# Patient Record
Sex: FEMALE | Race: WHITE | NOT HISPANIC OR LATINO
[De-identification: names, ages, dates, MRNs, and addresses within clinical notes are randomized per-mention and may not be internally consistent; named-entity substitution may affect disease eponyms.]

---

## 2017-03-15 ENCOUNTER — APPOINTMENT (OUTPATIENT)
Dept: UROLOGY | Facility: CLINIC | Age: 52
End: 2017-03-15

## 2017-03-23 ENCOUNTER — APPOINTMENT (OUTPATIENT)
Dept: UROLOGY | Facility: CLINIC | Age: 52
End: 2017-03-23
Payer: COMMERCIAL

## 2017-03-23 DIAGNOSIS — N28.1 CYST OF KIDNEY, ACQUIRED: ICD-10-CM

## 2017-03-23 LAB
APPEARANCE: CLEAR
BACTERIA: ABNORMAL
BILIRUBIN URINE: NEGATIVE
BLOOD URINE: NEGATIVE
COLOR: YELLOW
GLUCOSE QUALITATIVE U: NORMAL MG/DL
HYALINE CASTS: 3 /LPF
KETONES URINE: NEGATIVE
LEUKOCYTE ESTERASE URINE: NEGATIVE
MICROSCOPIC-UA: NORMAL
NITRITE URINE: NEGATIVE
PH URINE: 6.5
PROTEIN URINE: NEGATIVE MG/DL
RED BLOOD CELLS URINE: 5 /HPF
SPECIFIC GRAVITY URINE: 1.02
SQUAMOUS EPITHELIAL CELLS: 8 /HPF
UROBILINOGEN URINE: NORMAL MG/DL
WHITE BLOOD CELLS URINE: 1 /HPF

## 2017-03-23 PROCEDURE — 99213 OFFICE O/P EST LOW 20 MIN: CPT

## 2017-03-28 LAB — CORE LAB FLUID CYTOLOGY: NORMAL

## 2018-01-29 ENCOUNTER — EMERGENCY (EMERGENCY)
Facility: HOSPITAL | Age: 53
LOS: 1 days | Discharge: HOME | End: 2018-01-29
Attending: EMERGENCY MEDICINE | Admitting: EMERGENCY MEDICINE
Payer: COMMERCIAL

## 2018-01-29 VITALS
TEMPERATURE: 98 F | HEIGHT: 69 IN | WEIGHT: 195.11 LBS | DIASTOLIC BLOOD PRESSURE: 60 MMHG | RESPIRATION RATE: 18 BRPM | SYSTOLIC BLOOD PRESSURE: 119 MMHG | HEART RATE: 70 BPM | OXYGEN SATURATION: 98 %

## 2018-01-29 DIAGNOSIS — Z95.0 PRESENCE OF CARDIAC PACEMAKER: Chronic | ICD-10-CM

## 2018-01-29 LAB
ALBUMIN SERPL ELPH-MCNC: 3.4 G/DL — SIGNIFICANT CHANGE UP (ref 3.3–5)
ALP SERPL-CCNC: 66 U/L — SIGNIFICANT CHANGE UP (ref 40–120)
ALT FLD-CCNC: 25 U/L — SIGNIFICANT CHANGE UP (ref 12–78)
ANION GAP SERPL CALC-SCNC: 6 MMOL/L — SIGNIFICANT CHANGE UP (ref 5–17)
APTT BLD: 32.8 SEC — SIGNIFICANT CHANGE UP (ref 27.5–37.4)
AST SERPL-CCNC: 28 U/L — SIGNIFICANT CHANGE UP (ref 15–37)
BASOPHILS # BLD AUTO: 0.1 K/UL — SIGNIFICANT CHANGE UP (ref 0–0.2)
BASOPHILS NFR BLD AUTO: 1 % — SIGNIFICANT CHANGE UP (ref 0–2)
BILIRUB SERPL-MCNC: 0.2 MG/DL — SIGNIFICANT CHANGE UP (ref 0.2–1.2)
BUN SERPL-MCNC: 21 MG/DL — SIGNIFICANT CHANGE UP (ref 7–23)
CALCIUM SERPL-MCNC: 8.7 MG/DL — SIGNIFICANT CHANGE UP (ref 8.5–10.1)
CHLORIDE SERPL-SCNC: 105 MMOL/L — SIGNIFICANT CHANGE UP (ref 96–108)
CK MB BLD-MCNC: 1.6 % — SIGNIFICANT CHANGE UP (ref 0–3.5)
CK MB CFR SERPL CALC: 1.5 NG/ML — SIGNIFICANT CHANGE UP (ref 0–3.6)
CK SERPL-CCNC: 93 U/L — SIGNIFICANT CHANGE UP (ref 26–192)
CO2 SERPL-SCNC: 28 MMOL/L — SIGNIFICANT CHANGE UP (ref 22–31)
CREAT SERPL-MCNC: 0.69 MG/DL — SIGNIFICANT CHANGE UP (ref 0.5–1.3)
EOSINOPHIL # BLD AUTO: 0.3 K/UL — SIGNIFICANT CHANGE UP (ref 0–0.5)
EOSINOPHIL NFR BLD AUTO: 7.2 % — HIGH (ref 0–6)
GLUCOSE SERPL-MCNC: 90 MG/DL — SIGNIFICANT CHANGE UP (ref 70–99)
HCT VFR BLD CALC: 35.8 % — SIGNIFICANT CHANGE UP (ref 34.5–45)
HGB BLD-MCNC: 11.8 G/DL — SIGNIFICANT CHANGE UP (ref 11.5–15.5)
INR BLD: 1.02 RATIO — SIGNIFICANT CHANGE UP (ref 0.88–1.16)
LIDOCAIN IGE QN: 104 U/L — SIGNIFICANT CHANGE UP (ref 73–393)
LYMPHOCYTES # BLD AUTO: 1.6 K/UL — SIGNIFICANT CHANGE UP (ref 1–3.3)
LYMPHOCYTES # BLD AUTO: 33.5 % — SIGNIFICANT CHANGE UP (ref 13–44)
MCHC RBC-ENTMCNC: 29.8 PG — SIGNIFICANT CHANGE UP (ref 27–34)
MCHC RBC-ENTMCNC: 32.8 GM/DL — SIGNIFICANT CHANGE UP (ref 32–36)
MCV RBC AUTO: 91 FL — SIGNIFICANT CHANGE UP (ref 80–100)
MONOCYTES # BLD AUTO: 0.3 K/UL — SIGNIFICANT CHANGE UP (ref 0–0.9)
MONOCYTES NFR BLD AUTO: 6.5 % — SIGNIFICANT CHANGE UP (ref 1–9)
NEUTROPHILS # BLD AUTO: 2.5 K/UL — SIGNIFICANT CHANGE UP (ref 1.8–7.4)
NEUTROPHILS NFR BLD AUTO: 51.7 % — SIGNIFICANT CHANGE UP (ref 43–77)
PLATELET # BLD AUTO: 163 K/UL — SIGNIFICANT CHANGE UP (ref 150–400)
POTASSIUM SERPL-MCNC: 4.4 MMOL/L — SIGNIFICANT CHANGE UP (ref 3.5–5.3)
POTASSIUM SERPL-SCNC: 4.4 MMOL/L — SIGNIFICANT CHANGE UP (ref 3.5–5.3)
PROT SERPL-MCNC: 7.1 G/DL — SIGNIFICANT CHANGE UP (ref 6–8.3)
PROTHROM AB SERPL-ACNC: 11.1 SEC — SIGNIFICANT CHANGE UP (ref 9.8–12.7)
RBC # BLD: 3.94 M/UL — SIGNIFICANT CHANGE UP (ref 3.8–5.2)
RBC # FLD: 12.4 % — SIGNIFICANT CHANGE UP (ref 10.3–14.5)
SODIUM SERPL-SCNC: 139 MMOL/L — SIGNIFICANT CHANGE UP (ref 135–145)
TROPONIN I SERPL-MCNC: <.015 NG/ML — SIGNIFICANT CHANGE UP (ref 0.01–0.04)
WBC # BLD: 4.8 K/UL — SIGNIFICANT CHANGE UP (ref 3.8–10.5)
WBC # FLD AUTO: 4.8 K/UL — SIGNIFICANT CHANGE UP (ref 3.8–10.5)

## 2018-01-29 PROCEDURE — 36415 COLL VENOUS BLD VENIPUNCTURE: CPT

## 2018-01-29 PROCEDURE — 93005 ELECTROCARDIOGRAM TRACING: CPT

## 2018-01-29 PROCEDURE — 83690 ASSAY OF LIPASE: CPT

## 2018-01-29 PROCEDURE — 82550 ASSAY OF CK (CPK): CPT

## 2018-01-29 PROCEDURE — 80053 COMPREHEN METABOLIC PANEL: CPT

## 2018-01-29 PROCEDURE — 82553 CREATINE MB FRACTION: CPT

## 2018-01-29 PROCEDURE — 99284 EMERGENCY DEPT VISIT MOD MDM: CPT | Mod: 25

## 2018-01-29 PROCEDURE — 99285 EMERGENCY DEPT VISIT HI MDM: CPT

## 2018-01-29 PROCEDURE — 85730 THROMBOPLASTIN TIME PARTIAL: CPT

## 2018-01-29 PROCEDURE — 84484 ASSAY OF TROPONIN QUANT: CPT

## 2018-01-29 PROCEDURE — 85027 COMPLETE CBC AUTOMATED: CPT

## 2018-01-29 PROCEDURE — 71045 X-RAY EXAM CHEST 1 VIEW: CPT | Mod: 26

## 2018-01-29 PROCEDURE — 71045 X-RAY EXAM CHEST 1 VIEW: CPT

## 2018-01-29 PROCEDURE — 85610 PROTHROMBIN TIME: CPT

## 2018-01-29 RX ORDER — ASPIRIN/CALCIUM CARB/MAGNESIUM 324 MG
325 TABLET ORAL ONCE
Qty: 0 | Refills: 0 | Status: COMPLETED | OUTPATIENT
Start: 2018-01-29 | End: 2018-01-29

## 2018-01-29 RX ADMIN — Medication 325 MILLIGRAM(S): at 19:16

## 2018-01-29 NOTE — ED ADULT NURSE NOTE - OBJECTIVE STATEMENT
pt reports migraine this AM, took prescribed meds + excedrine w/o relief , sudden onset CP 1700 to L chest. Denies SHOB, no radiation, A+Ox4, ambulatory w.o assist.

## 2018-01-29 NOTE — ED PROVIDER NOTE - PROGRESS NOTE DETAILS
discussed case with Dr. White, recommend 2 sets of CE, will see patient in office offered patient to interrogate pacemaker device, but patient declined, wants to f/u with Dr. White in office for interrogation discussed case with Dr. White, recommend 2 sets of CE, will see patient in office and will interrogate her device in office offered patient to interrogate pacemaker device here in ED, but patient declined, wants to f/u with Dr. White in office for interrogation

## 2018-01-29 NOTE — ED PROVIDER NOTE - OBJECTIVE STATEMENT
53 yo female hx of pacemaker/sick sinus syndrome c/o chest pain for past 1 hour after taking her migraine medication zomig (she has taken this before), nonradiating, intermittent.  No shortness of breath, no nausea/vomiting.  Cards Dr. White.

## 2018-01-30 VITALS
SYSTOLIC BLOOD PRESSURE: 117 MMHG | DIASTOLIC BLOOD PRESSURE: 79 MMHG | HEART RATE: 66 BPM | OXYGEN SATURATION: 96 % | TEMPERATURE: 98 F | RESPIRATION RATE: 18 BRPM

## 2018-01-30 LAB
CK MB BLD-MCNC: 1.6 % — SIGNIFICANT CHANGE UP (ref 0–3.5)
CK MB CFR SERPL CALC: 1.3 NG/ML — SIGNIFICANT CHANGE UP (ref 0–3.6)
CK SERPL-CCNC: 82 U/L — SIGNIFICANT CHANGE UP (ref 26–192)
TROPONIN I SERPL-MCNC: <.015 NG/ML — SIGNIFICANT CHANGE UP (ref 0.01–0.04)

## 2018-02-15 ENCOUNTER — OUTPATIENT (OUTPATIENT)
Dept: OUTPATIENT SERVICES | Facility: HOSPITAL | Age: 53
LOS: 1 days | End: 2018-02-15
Payer: COMMERCIAL

## 2018-02-15 VITALS
RESPIRATION RATE: 18 BRPM | TEMPERATURE: 99 F | DIASTOLIC BLOOD PRESSURE: 72 MMHG | SYSTOLIC BLOOD PRESSURE: 123 MMHG | WEIGHT: 199.96 LBS | HEART RATE: 81 BPM | HEIGHT: 69 IN

## 2018-02-15 DIAGNOSIS — Z01.818 ENCOUNTER FOR OTHER PREPROCEDURAL EXAMINATION: ICD-10-CM

## 2018-02-15 DIAGNOSIS — M62.81 MUSCLE WEAKNESS (GENERALIZED): ICD-10-CM

## 2018-02-15 DIAGNOSIS — Z95.0 PRESENCE OF CARDIAC PACEMAKER: Chronic | ICD-10-CM

## 2018-02-15 DIAGNOSIS — Z95.0 PRESENCE OF CARDIAC PACEMAKER: ICD-10-CM

## 2018-02-15 LAB
ALBUMIN SERPL ELPH-MCNC: 3.7 G/DL — SIGNIFICANT CHANGE UP (ref 3.3–5)
ALP SERPL-CCNC: 66 U/L — SIGNIFICANT CHANGE UP (ref 40–120)
ALT FLD-CCNC: 21 U/L — SIGNIFICANT CHANGE UP (ref 12–78)
ANION GAP SERPL CALC-SCNC: 8 MMOL/L — SIGNIFICANT CHANGE UP (ref 5–17)
AST SERPL-CCNC: 24 U/L — SIGNIFICANT CHANGE UP (ref 15–37)
BILIRUB SERPL-MCNC: 0.3 MG/DL — SIGNIFICANT CHANGE UP (ref 0.2–1.2)
BUN SERPL-MCNC: 20 MG/DL — SIGNIFICANT CHANGE UP (ref 7–23)
CALCIUM SERPL-MCNC: 9.2 MG/DL — SIGNIFICANT CHANGE UP (ref 8.5–10.1)
CHLORIDE SERPL-SCNC: 109 MMOL/L — HIGH (ref 96–108)
CO2 SERPL-SCNC: 26 MMOL/L — SIGNIFICANT CHANGE UP (ref 22–31)
CREAT SERPL-MCNC: 0.56 MG/DL — SIGNIFICANT CHANGE UP (ref 0.5–1.3)
GLUCOSE SERPL-MCNC: 86 MG/DL — SIGNIFICANT CHANGE UP (ref 70–99)
HCG SERPL-ACNC: 2 MIU/ML — SIGNIFICANT CHANGE UP
HCT VFR BLD CALC: 38.5 % — SIGNIFICANT CHANGE UP (ref 34.5–45)
HGB BLD-MCNC: 12.8 G/DL — SIGNIFICANT CHANGE UP (ref 11.5–15.5)
MCHC RBC-ENTMCNC: 30.3 PG — SIGNIFICANT CHANGE UP (ref 27–34)
MCHC RBC-ENTMCNC: 33.2 GM/DL — SIGNIFICANT CHANGE UP (ref 32–36)
MCV RBC AUTO: 91.3 FL — SIGNIFICANT CHANGE UP (ref 80–100)
PLATELET # BLD AUTO: 186 K/UL — SIGNIFICANT CHANGE UP (ref 150–400)
POTASSIUM SERPL-MCNC: 4.4 MMOL/L — SIGNIFICANT CHANGE UP (ref 3.5–5.3)
POTASSIUM SERPL-SCNC: 4.4 MMOL/L — SIGNIFICANT CHANGE UP (ref 3.5–5.3)
PROT SERPL-MCNC: 7.5 G/DL — SIGNIFICANT CHANGE UP (ref 6–8.3)
RBC # BLD: 4.22 M/UL — SIGNIFICANT CHANGE UP (ref 3.8–5.2)
RBC # FLD: 12.3 % — SIGNIFICANT CHANGE UP (ref 10.3–14.5)
SODIUM SERPL-SCNC: 143 MMOL/L — SIGNIFICANT CHANGE UP (ref 135–145)
WBC # BLD: 4.6 K/UL — SIGNIFICANT CHANGE UP (ref 3.8–10.5)
WBC # FLD AUTO: 4.6 K/UL — SIGNIFICANT CHANGE UP (ref 3.8–10.5)

## 2018-02-15 PROCEDURE — 84702 CHORIONIC GONADOTROPIN TEST: CPT

## 2018-02-15 PROCEDURE — 85027 COMPLETE CBC AUTOMATED: CPT

## 2018-02-15 PROCEDURE — G0463: CPT

## 2018-02-15 PROCEDURE — 80053 COMPREHEN METABOLIC PANEL: CPT

## 2018-02-15 RX ORDER — METOPROLOL TARTRATE 50 MG
0 TABLET ORAL
Qty: 0 | Refills: 0 | COMMUNITY

## 2018-02-15 RX ORDER — ESCITALOPRAM OXALATE 10 MG/1
0 TABLET, FILM COATED ORAL
Qty: 0 | Refills: 0 | COMMUNITY

## 2018-02-15 RX ORDER — FAMOTIDINE 10 MG/ML
0 INJECTION INTRAVENOUS
Qty: 0 | Refills: 0 | COMMUNITY

## 2018-02-15 NOTE — H&P PST ADULT - ASSESSMENT
This is a 52 year old female with PMH of PPM, Htn and generalized muscle weakness who presents today for pre-surgical testing for Deep muscle biopsy to right leg on 02/23/18 with Dr Parikh.

## 2018-02-15 NOTE — H&P PST ADULT - PROBLEM SELECTOR PLAN 2
Labs- CBC, CMP ordered.   Medical Clearance with Dr. Robledo advised. Appointment on 02/20 /2018,   Pre op and Hibiclens instructions reviewed and given. Avoid NSAIDs and OTC supplements. Verbalized understanding

## 2018-02-15 NOTE — H&P PST ADULT - HISTORY OF PRESENT ILLNESS
This is a 52 year old female with PMH of PPM, Htn and generalized muscle weakness who presents today for pre-surgical testing for Deep muscle biopsy to right leg on 02/23/18 with Dr Parikh.  Reports progressive weakness to bilateral lower extremities with difficulty climbing stairs and was worked up extensively by neurologist and was referred to rheumatologist and numerous tests were done and was scheduled for biopsy of the muscle.

## 2018-02-15 NOTE — H&P PST ADULT - RS GEN PE MLT RESP DETAILS PC
normal/airway patent/no rhonchi/good air movement/no rales/clear to auscultation bilaterally/respirations non-labored/breath sounds equal/no wheezes

## 2018-02-15 NOTE — H&P PST ADULT - PMH
Anemia    Asthma    Endometriosis    Gastroesophageal reflux disease, esophagitis presence not specified    Hiatal hernia    Hypotension, unspecified hypotension type    Muscle weakness (generalized)    Sick sinus syndrome

## 2018-02-15 NOTE — H&P PST ADULT - NSANTHOSAYNRD_GEN_A_CORE
No. JORGE A screening performed.  STOP BANG Legend: 0-2 = LOW Risk; 3-4 = INTERMEDIATE Risk; 5-8 = HIGH Risk

## 2018-02-15 NOTE — H&P PST ADULT - PROBLEM SELECTOR PLAN 3
Cardioloy clearance with Dr White , appointment on 02/20/18  Interrogation report required.  Verbalized understanding

## 2018-02-15 NOTE — H&P PST ADULT - MUSCULOSKELETAL
details… detailed exam no joint swelling/ROM intact/no joint erythema/normal/no joint warmth/no calf tenderness

## 2018-02-15 NOTE — H&P PST ADULT - FAMILY HISTORY
Father  Still living? Yes, Estimated age: Age Unknown  Afib, Age at diagnosis: Age Unknown     Mother  Still living? Yes, Estimated age: Age Unknown  Family history of osteoporosis, Age at diagnosis: Age Unknown

## 2018-02-22 RX ORDER — SODIUM CHLORIDE 9 MG/ML
1000 INJECTION, SOLUTION INTRAVENOUS
Qty: 0 | Refills: 0 | Status: DISCONTINUED | OUTPATIENT
Start: 2018-02-23 | End: 2018-02-23

## 2018-02-23 ENCOUNTER — RESULT REVIEW (OUTPATIENT)
Age: 53
End: 2018-02-23

## 2018-02-23 ENCOUNTER — OUTPATIENT (OUTPATIENT)
Dept: OUTPATIENT SERVICES | Facility: HOSPITAL | Age: 53
LOS: 1 days | End: 2018-02-23
Payer: COMMERCIAL

## 2018-02-23 ENCOUNTER — TRANSCRIPTION ENCOUNTER (OUTPATIENT)
Age: 53
End: 2018-02-23

## 2018-02-23 VITALS
OXYGEN SATURATION: 97 % | WEIGHT: 199.96 LBS | HEART RATE: 81 BPM | TEMPERATURE: 98 F | DIASTOLIC BLOOD PRESSURE: 64 MMHG | HEIGHT: 69 IN | SYSTOLIC BLOOD PRESSURE: 109 MMHG | RESPIRATION RATE: 16 BRPM

## 2018-02-23 VITALS
OXYGEN SATURATION: 97 % | RESPIRATION RATE: 15 BRPM | TEMPERATURE: 98 F | SYSTOLIC BLOOD PRESSURE: 112 MMHG | HEART RATE: 65 BPM | DIASTOLIC BLOOD PRESSURE: 69 MMHG

## 2018-02-23 DIAGNOSIS — M62.81 MUSCLE WEAKNESS (GENERALIZED): ICD-10-CM

## 2018-02-23 DIAGNOSIS — Z95.0 PRESENCE OF CARDIAC PACEMAKER: Chronic | ICD-10-CM

## 2018-02-23 LAB
HCG UR QL: NEGATIVE — SIGNIFICANT CHANGE UP
SURGICAL PATHOLOGY FINAL REPORT - CH: SIGNIFICANT CHANGE UP

## 2018-02-23 PROCEDURE — 81025 URINE PREGNANCY TEST: CPT

## 2018-02-23 PROCEDURE — 88300 SURGICAL PATH GROSS: CPT

## 2018-02-23 PROCEDURE — 20205 DEEP MUSCLE BIOPSY: CPT

## 2018-02-23 PROCEDURE — 88300 SURGICAL PATH GROSS: CPT | Mod: 26

## 2018-02-23 RX ORDER — METOPROLOL TARTRATE 50 MG
12.5 TABLET ORAL
Qty: 0 | Refills: 0 | COMMUNITY

## 2018-02-23 RX ORDER — PREGABALIN 225 MG/1
1 CAPSULE ORAL
Qty: 0 | Refills: 0 | COMMUNITY

## 2018-02-23 RX ORDER — TETRAHYDROZOLINE/POLYETHYL GLY 0.05 %-1 %
1 DROPS OPHTHALMIC (EYE)
Qty: 0 | Refills: 0 | COMMUNITY

## 2018-02-23 RX ORDER — ONDANSETRON 8 MG/1
4 TABLET, FILM COATED ORAL ONCE
Qty: 0 | Refills: 0 | Status: DISCONTINUED | OUTPATIENT
Start: 2018-02-23 | End: 2018-02-23

## 2018-02-23 RX ORDER — FAMOTIDINE 10 MG/ML
1 INJECTION INTRAVENOUS
Qty: 0 | Refills: 0 | COMMUNITY

## 2018-02-23 RX ORDER — HYDROMORPHONE HYDROCHLORIDE 2 MG/ML
0.5 INJECTION INTRAMUSCULAR; INTRAVENOUS; SUBCUTANEOUS
Qty: 0 | Refills: 0 | Status: DISCONTINUED | OUTPATIENT
Start: 2018-02-23 | End: 2018-02-23

## 2018-02-23 RX ORDER — CHOLECALCIFEROL (VITAMIN D3) 125 MCG
1 CAPSULE ORAL
Qty: 0 | Refills: 0 | COMMUNITY

## 2018-02-23 RX ORDER — ACETAMINOPHEN 500 MG
2 TABLET ORAL
Qty: 0 | Refills: 0 | COMMUNITY

## 2018-02-23 RX ORDER — SODIUM CHLORIDE 9 MG/ML
1000 INJECTION, SOLUTION INTRAVENOUS
Qty: 0 | Refills: 0 | Status: DISCONTINUED | OUTPATIENT
Start: 2018-02-23 | End: 2018-02-23

## 2018-02-23 RX ORDER — ASCORBIC ACID 60 MG
1 TABLET,CHEWABLE ORAL
Qty: 0 | Refills: 0 | COMMUNITY

## 2018-02-23 RX ORDER — ESCITALOPRAM OXALATE 10 MG/1
1 TABLET, FILM COATED ORAL
Qty: 0 | Refills: 0 | COMMUNITY

## 2018-02-23 RX ADMIN — SODIUM CHLORIDE 50 MILLILITER(S): 9 INJECTION, SOLUTION INTRAVENOUS at 08:35

## 2018-02-23 RX ADMIN — SODIUM CHLORIDE 100 MILLILITER(S): 9 INJECTION, SOLUTION INTRAVENOUS at 10:55

## 2018-02-23 NOTE — BRIEF OPERATIVE NOTE - PROCEDURE
<<-----Click on this checkbox to enter Procedure Muscle biopsy  02/23/2018  RIGHT thigh  Active  BUSHRA

## 2018-02-23 NOTE — ASU DISCHARGE PLAN (ADULT/PEDIATRIC). - FOLLOWUP APPOINTMENT CLINIC/PHYSICIAN
Please call Dr. ANNABELLA Parikh's office ([phone]) to schedule a follow-up appointment to be seen in 7-10 days.

## 2018-05-09 NOTE — ED PROVIDER NOTE - CHPI ED SYMPTOMS POS
EKG completed at 10:18, viewed and signed by Dr Viktoriya Ren, copy on chart     Ismaelelias Janelle  05/09/18 1038 CHEST PAIN

## 2018-05-15 ENCOUNTER — APPOINTMENT (OUTPATIENT)
Dept: UROLOGY | Facility: CLINIC | Age: 53
End: 2018-05-15

## 2018-12-10 PROBLEM — K21.9 GASTRO-ESOPHAGEAL REFLUX DISEASE WITHOUT ESOPHAGITIS: Chronic | Status: ACTIVE | Noted: 2018-02-15

## 2018-12-10 PROBLEM — M62.81 MUSCLE WEAKNESS (GENERALIZED): Chronic | Status: ACTIVE | Noted: 2018-02-15

## 2018-12-10 PROBLEM — J45.909 UNSPECIFIED ASTHMA, UNCOMPLICATED: Chronic | Status: ACTIVE | Noted: 2018-02-15

## 2018-12-10 PROBLEM — I49.5 SICK SINUS SYNDROME: Chronic | Status: ACTIVE | Noted: 2018-01-29

## 2018-12-10 PROBLEM — D64.9 ANEMIA, UNSPECIFIED: Chronic | Status: ACTIVE | Noted: 2018-02-15

## 2018-12-10 PROBLEM — I95.9 HYPOTENSION, UNSPECIFIED: Chronic | Status: ACTIVE | Noted: 2018-02-15

## 2018-12-10 PROBLEM — N80.9 ENDOMETRIOSIS, UNSPECIFIED: Chronic | Status: ACTIVE | Noted: 2018-02-15

## 2018-12-10 PROBLEM — K44.9 DIAPHRAGMATIC HERNIA WITHOUT OBSTRUCTION OR GANGRENE: Chronic | Status: ACTIVE | Noted: 2018-02-15

## 2018-12-19 ENCOUNTER — APPOINTMENT (OUTPATIENT)
Dept: RHEUMATOLOGY | Facility: CLINIC | Age: 53
End: 2018-12-19
Payer: COMMERCIAL

## 2018-12-19 VITALS
OXYGEN SATURATION: 98 % | HEIGHT: 68 IN | SYSTOLIC BLOOD PRESSURE: 135 MMHG | WEIGHT: 226 LBS | HEART RATE: 83 BPM | TEMPERATURE: 98.2 F | DIASTOLIC BLOOD PRESSURE: 83 MMHG | BODY MASS INDEX: 34.25 KG/M2

## 2018-12-19 DIAGNOSIS — G72.9 MYOPATHY, UNSPECIFIED: ICD-10-CM

## 2018-12-19 DIAGNOSIS — M62.81 MUSCLE WEAKNESS (GENERALIZED): ICD-10-CM

## 2018-12-19 PROCEDURE — 99244 OFF/OP CNSLTJ NEW/EST MOD 40: CPT | Mod: GC

## 2018-12-19 RX ORDER — METOPROLOL TARTRATE 75 MG/1
TABLET, FILM COATED ORAL
Refills: 0 | Status: ACTIVE | COMMUNITY

## 2018-12-20 PROBLEM — G72.9 MYOPATHY: Status: ACTIVE | Noted: 2018-12-20

## 2019-02-08 ENCOUNTER — APPOINTMENT (OUTPATIENT)
Dept: NEUROLOGY | Facility: CLINIC | Age: 54
End: 2019-02-08
Payer: COMMERCIAL

## 2019-02-08 VITALS
SYSTOLIC BLOOD PRESSURE: 110 MMHG | HEART RATE: 80 BPM | HEIGHT: 68 IN | BODY MASS INDEX: 44.41 KG/M2 | WEIGHT: 293 LBS | DIASTOLIC BLOOD PRESSURE: 72 MMHG

## 2019-02-08 DIAGNOSIS — Z95.0 PRESENCE OF CARDIAC PACEMAKER: ICD-10-CM

## 2019-02-08 DIAGNOSIS — G71.11 MYOTONIC MUSCULAR DYSTROPHY: ICD-10-CM

## 2019-02-08 PROCEDURE — 99205 OFFICE O/P NEW HI 60 MIN: CPT

## 2019-02-08 NOTE — PHYSICAL EXAM
[General Appearance - Alert] : alert [General Appearance - In No Acute Distress] : in no acute distress [Person] : oriented to person [Place] : oriented to place [Time] : oriented to time [Short Term Intact] : short term memory intact [Remote Intact] : remote memory intact [Registration Intact] : recent registration memory intact [Concentration Intact] : normal concentrating ability [Visual Intact] : visual attention was ~T not ~L decreased [Naming Objects] : no difficulty naming common objects [Repeating Phrases] : no difficulty repeating a phrase [Writing A Sentence] : no difficulty writing a sentence [Fluency] : fluency intact [Comprehension] : comprehension intact [Reading] : reading intact [Past History] : adequate knowledge of personal past history [Cranial Nerves Optic (II)] : visual acuity intact bilaterally,  visual fields full to confrontation, pupils equal round and reactive to light [Cranial Nerves Oculomotor (III)] : extraocular motion intact [Cranial Nerves Trigeminal (V)] : facial sensation intact symmetrically [Cranial Nerves Facial (VII)] : face symmetrical [Cranial Nerves Vestibulocochlear (VIII)] : hearing was intact bilaterally [Cranial Nerves Glossopharyngeal (IX)] : tongue and palate midline [Cranial Nerves Accessory (XI - Cranial And Spinal)] : head turning and shoulder shrug symmetric [Cranial Nerves Hypoglossal (XII)] : there was no tongue deviation with protrusion [Motor Tone] : muscle tone was normal in all four extremities [Motor Strength] : muscle strength was normal in all four extremities [No Muscle Atrophy] : normal bulk in all four extremities [Motor Handedness Right-Handed] : the patient is right hand dominant [Sensation Tactile Decrease] : light touch was intact [Abnormal Walk] : normal gait [Balance] : balance was intact [Past-pointing] : there was no past-pointing [Tremor] : no tremor present [2+] : Ankle jerk left 2+ [Plantar Reflex Right Only] : normal on the right [Plantar Reflex Left Only] : normal on the left [FreeTextEntry6] : percussion myotonia, slight, bilat thenars [] : no respiratory distress [Auscultation Breath Sounds / Voice Sounds] : lungs were clear to auscultation bilaterally [Heart Rate And Rhythm] : heart rate was normal and rhythm regular [Heart Sounds] : normal S1 and S2 [Heart Sounds Gallop] : no gallops [Murmurs] : no murmurs [Heart Sounds Pericardial Friction Rub] : no pericardial rub [Full Pulse] : the pedal pulses are present [Edema] : there was no peripheral edema

## 2019-02-08 NOTE — HISTORY OF PRESENT ILLNESS
[FreeTextEntry1] : CC: Leg weakness for years.\par \par In the past 2 years, leg weakness has been getting worse. However, she states she probably started getting weak 15 years ago or more.  Weakness is in both legs equally. Feels weaker in her posterior thighs vs her feet.  If she sits down on a chair, she cannot get up, unless she uses her arms to push off. If she is sitting on the floor, she cannot get up. Has difficulty going up stairs. Not problem with walking on flat surfaces. Denies pain. Denies numbness or tingling in her legs. Denies back pain. Denies shoulder weakness. Denies dysphagia or dysarthria. Denies respiratory symptoms. Has not had any falls. No bowel/bladder incontinence. \par \par Normal motor milestones, normal adolescence.  \par \par Reports she has been to to several physicians for workup. Had a muscle biopsy (right thigh) about one year ago (ordered by Rheumatologist Dr. Charles) and was told she has myopathy. \par \par Family History: Of note her mother, grandmother and sister (in her 40s) all have similar leg weakness. Her mother is now so weak that she cannot walk well anymore.  She has one daughter age 22, no symptoms. One brother with cataracts at age 40, mother also about age 40 with cataracts.    \par \par Per Dr. Mary's note, bx thigh muscle showed mild non-specific inflammation\par \par CK 67

## 2019-02-08 NOTE — ASSESSMENT
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  One of the LGMD's (type 1) is also in DDx. \par \par Will start with genetic testing for DM2, if negative will move to broader NM gene panel.  Agree that this is not inflammatory and would not treat with steroids.

## 2019-02-11 NOTE — ED PROVIDER NOTE - GENITOURINARY NEGATIVE STATEMENT, MLM
no dysuria, no frequency, and no hematuria. Cheek-To-Nose Interpolation Flap Text: A decision was made to reconstruct the defect utilizing an interpolation axial flap and a staged reconstruction.  A telfa template was made of the defect.  This telfa template was then used to outline the Cheek-To-Nose Interpolation flap.  The donor area for the pedicle flap was then injected with anesthesia.  The flap was excised through the skin and subcutaneous tissue down to the layer of the underlying musculature.  The interpolation flap was carefully excised within this deep plane to maintain its blood supply.  The edges of the donor site were undermined.   The donor site was closed in a primary fashion.  The pedicle was then rotated into position and sutured.  Once the tube was sutured into place, adequate blood supply was confirmed with blanching and refill.  The pedicle was then wrapped with xeroform gauze and dressed appropriately with a telfa and gauze bandage to ensure continued blood supply and protect the attached pedicle.

## 2021-01-05 ENCOUNTER — APPOINTMENT (OUTPATIENT)
Dept: OTOLARYNGOLOGY | Facility: CLINIC | Age: 56
End: 2021-01-05
Payer: COMMERCIAL

## 2021-01-05 VITALS
BODY MASS INDEX: 24.88 KG/M2 | SYSTOLIC BLOOD PRESSURE: 120 MMHG | DIASTOLIC BLOOD PRESSURE: 70 MMHG | TEMPERATURE: 98 F | HEIGHT: 69 IN | WEIGHT: 168 LBS

## 2021-01-05 PROCEDURE — 99214 OFFICE O/P EST MOD 30 MIN: CPT

## 2021-01-05 PROCEDURE — 99072 ADDL SUPL MATRL&STAF TM PHE: CPT

## 2021-01-05 NOTE — HISTORY OF PRESENT ILLNESS
[de-identified] : 55 yr old female w hx of OE, last tx w tobradex 6 months ago.  Cortisone on a Qtips has been helping more.\par c/o lots of nasal congestion, scratchy throat  and sneezing since 3/2020. No relief w zyrtec, Flonase or Azelastine.  Has been using Afrin 2 months ago, using it bid.\par +hx sinusitis, but not recently\par had an allergy eval as a child, many +'s, got shots as a child

## 2021-01-05 NOTE — ASSESSMENT
[FreeTextEntry1] : d/c Afrin to tx rhinitis medicamentosum\par Informed consent for steroids: We reviewed the risks of oral prednisone with include, but are not limited to: mood lability, irritability, appetite stimulation, anxiety, hypertension, difficulty sleeping, vivid dreams, hyperglycemia, cataracts, increased intra-ocular pressure, GI upset, increased bone turnover causing or exacerbating osteopenia, and risk of avascular neurosis of the hip and long bones. Verbal informed consent was obtained for the use of prednisone.\par \par rx Medrol dosepak\par rx Flonase, iprtroprium\par switch from zytec to allegra\par \par rx elocon\par \par allergy eval\par f/u 2-3 weeks

## 2021-01-05 NOTE — REVIEW OF SYSTEMS
[Ear Pain] : ear pain [Ear Itch] : ear itch [Dizziness] : dizziness [Vertigo] : vertigo [Lightheadedness] : lightheadedness [Nasal Congestion] : nasal congestion [Recurrent Sinus Infections] : recurrent sinus infections [Problem Snoring] : problem snoring [Sinus Pressure] : sinus pressure [Eyes Itch] : itching of the eyes [Palpitations] : palpitations [Wheezing] : wheezing [Heartburn] : heartburn [Itching] : itching [Negative] : Heme/Lymph [de-identified] : headaches

## 2021-01-05 NOTE — PHYSICAL EXAM
[Normal] : mucosa is normal [Midline] : trachea located in midline position [de-identified] : right ariana and EAC red and dry [de-identified] : enlarged

## 2021-01-19 ENCOUNTER — APPOINTMENT (OUTPATIENT)
Dept: OTOLARYNGOLOGY | Facility: CLINIC | Age: 56
End: 2021-01-19
Payer: COMMERCIAL

## 2021-01-19 VITALS
HEART RATE: 87 BPM | HEIGHT: 69 IN | SYSTOLIC BLOOD PRESSURE: 104 MMHG | BODY MASS INDEX: 24.73 KG/M2 | DIASTOLIC BLOOD PRESSURE: 70 MMHG | TEMPERATURE: 97.3 F | WEIGHT: 167 LBS

## 2021-01-19 DIAGNOSIS — H60.62 UNSPECIFIED CHRONIC OTITIS EXTERNA, LEFT EAR: ICD-10-CM

## 2021-01-19 DIAGNOSIS — J30.2 OTHER SEASONAL ALLERGIC RHINITIS: ICD-10-CM

## 2021-01-19 PROCEDURE — 99072 ADDL SUPL MATRL&STAF TM PHE: CPT

## 2021-01-19 PROCEDURE — 99213 OFFICE O/P EST LOW 20 MIN: CPT

## 2021-01-19 NOTE — PHYSICAL EXAM
[] : septum deviated to the left [de-identified] : enlarged [Normal] : mucosa is normal [Midline] : trachea located in midline position

## 2021-01-19 NOTE — HISTORY OF PRESENT ILLNESS
[de-identified] : 55 yr old female w rhinitis medicamentosum, stopped the Afrin, completed Medrol Dosepak, using Flonase and Ipratropium.  Allergy eval pending so she stopped meds.   Feels like she's starting to wheeze, will use her inhaler.\par +good relief w elocon for OE

## 2021-01-19 NOTE — ASSESSMENT
[FreeTextEntry1] : OE resolved, ok to use the elocon as needed for flare up\par allergy eval pending\par f/u prn

## 2021-01-19 NOTE — REVIEW OF SYSTEMS
[As Noted in HPI] : as noted in HPI [Nasal Congestion] : no nasal congestion [Recurrent Sinus Infections] : recurrent sinus infections [Problem Snoring] : problem snoring [Sinus Pressure] : no sinus pressure [Eyes Itch] : itching of the eyes [Palpitations] : palpitations [Wheezing] : wheezing [Heartburn] : heartburn [Itching] : itching [Negative] : Heme/Lymph [de-identified] : headaches

## 2021-01-21 ENCOUNTER — APPOINTMENT (OUTPATIENT)
Dept: PEDIATRIC ALLERGY IMMUNOLOGY | Facility: CLINIC | Age: 56
End: 2021-01-21
Payer: COMMERCIAL

## 2021-01-21 VITALS
SYSTOLIC BLOOD PRESSURE: 107 MMHG | OXYGEN SATURATION: 97 % | HEART RATE: 84 BPM | DIASTOLIC BLOOD PRESSURE: 73 MMHG | RESPIRATION RATE: 20 BRPM | HEIGHT: 66 IN | WEIGHT: 173.5 LBS | BODY MASS INDEX: 27.88 KG/M2

## 2021-01-21 DIAGNOSIS — Z87.891 PERSONAL HISTORY OF NICOTINE DEPENDENCE: ICD-10-CM

## 2021-01-21 DIAGNOSIS — Z88.0 ALLERGY STATUS TO PENICILLIN: ICD-10-CM

## 2021-01-21 DIAGNOSIS — J45.991 COUGH VARIANT ASTHMA: ICD-10-CM

## 2021-01-21 PROCEDURE — 99203 OFFICE O/P NEW LOW 30 MIN: CPT | Mod: 25

## 2021-01-21 PROCEDURE — 95004 PERQ TESTS W/ALRGNC XTRCS: CPT

## 2021-01-21 PROCEDURE — 99072 ADDL SUPL MATRL&STAF TM PHE: CPT

## 2021-01-21 RX ORDER — MOMETASONE FUROATE 1 MG/G
0.1 CREAM TOPICAL TWICE DAILY
Qty: 1 | Refills: 1 | Status: DISCONTINUED | COMMUNITY
Start: 2021-01-05 | End: 2021-01-21

## 2021-01-21 RX ORDER — NAPROXEN 500 MG/1
500 TABLET ORAL
Qty: 60 | Refills: 0 | Status: DISCONTINUED | COMMUNITY
Start: 2020-12-03

## 2021-01-21 RX ORDER — ESCITALOPRAM OXALATE 20 MG/1
20 TABLET ORAL
Qty: 90 | Refills: 0 | Status: DISCONTINUED | COMMUNITY
Start: 2020-07-15

## 2021-01-21 RX ORDER — METHYLPREDNISOLONE 4 MG/1
4 TABLET ORAL
Qty: 1 | Refills: 0 | Status: DISCONTINUED | COMMUNITY
Start: 2021-01-05 | End: 2021-01-21

## 2021-01-21 RX ORDER — METOPROLOL SUCCINATE 25 MG/1
25 TABLET, EXTENDED RELEASE ORAL
Qty: 24 | Refills: 0 | Status: DISCONTINUED | COMMUNITY
Start: 2020-08-12

## 2021-01-21 RX ORDER — IPRATROPIUM BROMIDE 21 UG/1
0.03 SPRAY NASAL 3 TIMES DAILY
Qty: 1 | Refills: 5 | Status: DISCONTINUED | COMMUNITY
Start: 2021-01-05 | End: 2021-01-21

## 2021-01-21 NOTE — REVIEW OF SYSTEMS
[Eye Itching] : itchy eyes [Rhinorrhea] : rhinorrhea [Nasal Congestion] : nasal congestion [Nasal Itching] : nasal itching [Post Nasal Drip] : post nasal drip [Sneezing] : sneezing [Palpitations] : palpitations [Cough] : cough [Nl] : Integumentary [SOB at Rest] : no shortness of breath at rest [SOB with Exertion] : no dyspnea on exertion [Sputum Production] : not coughing up sputum [Wheezing Worsens With Exercise] : wheezing does not worsen with exercise

## 2021-01-21 NOTE — IMPRESSION
[Allergy Testing Dog] : dog [Allergy Testing Cat] : cat [Allergy Testing Weeds] : weeds [Allergy Testing Grasses] : grasses [Allergy Testing Dust Mite] : dust mites [Allergy Testing Mixed Feathers] : feathers [Allergy Testing Cockroach] : cockroach [] : molds [Allergy Testing Trees] : trees

## 2021-01-21 NOTE — HISTORY OF PRESENT ILLNESS
[de-identified] : 55 yr old seen by me many yrs ago for allergic rhinitis now returns for follow up. She is now complaining of persistent rhinitis, itchy throat, red itchy eyes, and newer onset cough and wheezing for the past year. Worsening complaints are noted at night when lying flat in bed. Nocturnal awakening are not seen. Increased complaints are noted with cats and a dog is present in the home for 6 yrs. She is followed by Dr. Vanessa for OE and rhinitis - she was using Afrin several times per day at the time but was able to taper off by using Flonase and ipratropium but now states she is using just Flonase. Standard H1 antihistamines such as Zyrtec and Allegra she states don’t work.  she has previously been treated with oral steroids which she says do work but realizes she cannot stay on them \par She has an underlying cardiac arrhythmia and has a pacemaker placed - she currently is on a beta blocker which makes use of allergy shots for the most part contraindicated. She states she had a nasal endoscopy but does not recall results\par She has a history of PCN allergy as child with hives.  Doxycycline and Cipro give her nausea but she can take macrolides and Levaquin.  She is a candidate for PCN testing in the future.

## 2021-01-21 NOTE — REASON FOR VISIT
[Initial Evaluation] : an initial evaluation of [Allergy Evaluation/ Skin Testing] : allergy evaluation and or skin testing [Congestion] : congestion [Runny Nose] : runny nose [Itchy Eyes] : itchy eyes [Red Eyes] : red eyes [To Medication] : allergy to medication [Wheezing] : wheezing

## 2021-01-21 NOTE — ASSESSMENT
[FreeTextEntry1] : 55 yr old with chronic rhinitis, allergic rhinitis, presumptive cough variant asthma, over use of Afrin at time and a history of reaction to PCN as child\par \par Skin tests today showed positive tests to cat, dog (at home), grass pollen, weed pollen\par \par Suggest\par 1. Dymista 2s bid as trial added to Zyrtec 10 mg bid\par 2. Discussed use of Singulair however pt has panic attacks and after discussing side effects she is not happy giving this a try\par 3. Allergy shots were discussed however beta blockers are a relative contraindication to their use\par 4. Consider PCN skin testing in future.\par 5. Will consider trial of Ventolin at next visits if cough does not improve\par Will follow up 3 weeks

## 2021-01-21 NOTE — SOCIAL HISTORY
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

## 2021-01-21 NOTE — PHYSICAL EXAM
[Alert] : alert [Well Nourished] : well nourished [No Discharge] : no discharge [Normal TMs] : both tympanic membranes were normal [No Thrush] : no thrush [Posterior Pharyngeal Cobblestoning] : posterior pharyngeal cobblestoning [Clear Rhinorrhea] : clear rhinorrhea was seen [Normal Rate and Effort] : normal respiratory rhythm and effort [No Crackles] : no crackles [Normal S1, S2] : normal S1 and S2 [Normal Rate] : heart rate was normal  [Skin Intact] : skin intact  [Boggy Nasal Turbinates] : no boggy and/or pale nasal turbinates [Wheezing] : no wheezing was heard

## 2021-01-28 ENCOUNTER — NON-APPOINTMENT (OUTPATIENT)
Age: 56
End: 2021-01-28

## 2021-04-27 ENCOUNTER — APPOINTMENT (OUTPATIENT)
Dept: PEDIATRIC ALLERGY IMMUNOLOGY | Facility: CLINIC | Age: 56
End: 2021-04-27

## 2021-06-30 ENCOUNTER — APPOINTMENT (OUTPATIENT)
Dept: NEUROLOGY | Facility: CLINIC | Age: 56
End: 2021-06-30

## 2021-09-20 NOTE — H&P PST ADULT - GENERAL
Issac Lynn needs refill of   Requested Prescriptions     Pending Prescriptions Disp Refills    tamsulosin (FLOMAX) 0.4 MG capsule [Pharmacy Med Name: TAMSULOSIN HCL 0.4 MG CAPSULE] 90 capsule 3     Sig: take 1 capsule by mouth once daily    tadalafil (CIALIS) 20 MG tablet [Pharmacy Med Name: TADALAFIL 20 MG TABLET] 6 tablet 11     Sig: take 1/2 to 1 tablets by mouth once daily if needed for ERECTILE DYSFUNCTION. Last Filled on:  8/5/2020    Last Visit Date:  8/5/2020    Next Visit Date:    Visit date not found    Patient is due for annual. Please check and see if he needs a short term supply. negative

## 2022-01-06 ENCOUNTER — APPOINTMENT (OUTPATIENT)
Dept: OTOLARYNGOLOGY | Facility: CLINIC | Age: 57
End: 2022-01-06
Payer: COMMERCIAL

## 2022-01-06 VITALS
WEIGHT: 190 LBS | HEIGHT: 66 IN | DIASTOLIC BLOOD PRESSURE: 68 MMHG | BODY MASS INDEX: 30.53 KG/M2 | SYSTOLIC BLOOD PRESSURE: 105 MMHG | HEART RATE: 81 BPM

## 2022-01-06 DIAGNOSIS — R42 DIZZINESS AND GIDDINESS: ICD-10-CM

## 2022-01-06 DIAGNOSIS — H90.3 SENSORINEURAL HEARING LOSS, BILATERAL: ICD-10-CM

## 2022-01-06 PROCEDURE — 92557 COMPREHENSIVE HEARING TEST: CPT

## 2022-01-06 PROCEDURE — 92570 ACOUSTIC IMMITANCE TESTING: CPT

## 2022-01-06 PROCEDURE — 99214 OFFICE O/P EST MOD 30 MIN: CPT

## 2022-01-06 RX ORDER — CETIRIZINE HCL 10 MG
10 TABLET ORAL
Refills: 0 | Status: DISCONTINUED | COMMUNITY
End: 2022-01-06

## 2022-01-06 RX ORDER — AZELASTINE HYDROCHLORIDE AND FLUTICASONE PROPIONATE 137; 50 UG/1; UG/1
137-50 SPRAY, METERED NASAL DAILY
Qty: 1 | Refills: 0 | Status: DISCONTINUED | COMMUNITY
Start: 2021-01-21 | End: 2022-01-06

## 2022-01-06 RX ORDER — FLUTICASONE PROPIONATE 50 UG/1
50 SPRAY, METERED NASAL
Qty: 1 | Refills: 5 | Status: DISCONTINUED | COMMUNITY
Start: 2021-01-05 | End: 2022-01-06

## 2022-01-06 NOTE — PHYSICAL EXAM
[Midline] : trachea located in midline position [Normal] : orientation to person, place, and time: normal [] : Tandem Romberg test is positive

## 2022-01-06 NOTE — ASSESSMENT
[FreeTextEntry1] :   WNL sloping to mod HF SNHL w type Ad AU\par annual audio\par \par \par d/c allegra and go back on zyrtec\par rx flonase and azelastine\par \par f/u 2 weeks\par will ABR, ECOG and VNG if no improvement

## 2022-01-06 NOTE — HISTORY OF PRESENT ILLNESS
[de-identified] : 56 yr old female with bilat rhinorrhea (white mucous) for about 6 months\par +allergy tx w allegra for a few months on and off, not helping much\par +hx sinusitis, most recent about a year ago\par \par c/o dizziness for a month. when she get up in the morning, has vertigo, or any quick change in head position during the day\par rare occ tinnitus that passes quickly\par -hx otitis, noise exp\par +FH mother and sister have vertigo. sister had head trauma.  ?etiology for mother \par +head trauma with LOC after a fall 2021 (was dizzy after that, but it resolved)\par

## 2022-01-25 NOTE — ASU DISCHARGE PLAN (ADULT/PEDIATRIC). - MEDICATION SUMMARY - MEDICATIONS TO TAKE
I will START or STAY ON the medications listed below when I get home from the hospital:    omega calcium  -- Indication: For VITAMIN    Excedrin oral tablet  -- 2 tab(s) by mouth every 6 hours, As Needed  -- Indication: For HOME MEDICATION    Tylenol  -- 2 cap(s) by mouth prn, As Needed  -- Indication: For PAIN - DO NOT TAKE WHILE TAKING OTHER ACETAMINOPHEN PRODCUTS.    oxyCODONE-acetaminophen 5 mg-325 mg oral tablet  -- 1 tab(s) by mouth every 6 hours, As Needed -for severe pain MDD:4   -- Caution federal law prohibits the transfer of this drug to any person other  than the person for whom it was prescribed.  May cause drowsiness.  Alcohol may intensify this effect.  Use care when operating dangerous machinery.  This prescription cannot be refilled.  This product contains acetaminophen.  Do not use  with any other product containing acetaminophen to prevent possible liver damage.  Using more of this medication than prescribed may cause serious breathing problems.    -- Indication: For PAIN -  DO NOT TAKE WHILE TAKING OTHER ACETAMINOPHEN PRODCUTS.    Lexapro 10 mg oral tablet  -- 1 tab(s) by mouth once a day  -- Indication: For ANXIETY    metoprolol  -- 12.5 milligram(s) by mouth once a day (at bedtime)  -- Indication: For HTN    Pepcid 20 mg oral tablet  -- 1 tab(s) by mouth 2 times a day  -- Indication: For GERD    Visine 0.05% ophthalmic solution  -- 1 drop(s) to each affected eye 4 times a day  -- Indication: For EYE DROP    Vitamin C 1000 mg oral tablet  -- 1 tab(s) by mouth once a day  -- Indication: For VITAMIN    Vitamin D3 2000 intl units oral tablet  -- 1 tab(s) by mouth once a day  -- Indication: For VITAMIN    Vitamin B12 500 mcg oral tablet  -- 1 tab(s) by mouth once a day  -- Indication: For VITAMIN 4 = No assist / stand by assistance

## 2022-01-28 RX ORDER — FLUTICASONE PROPIONATE 50 UG/1
50 SPRAY, METERED NASAL
Qty: 3 | Refills: 3 | Status: ACTIVE | COMMUNITY
Start: 2022-01-06 | End: 1900-01-01

## 2022-02-04 ENCOUNTER — RESULT REVIEW (OUTPATIENT)
Age: 57
End: 2022-02-04

## 2022-07-27 NOTE — ED ADULT NURSE NOTE - NEURO WDL
"Chief Complaint   Patient presents with     Sleep Problem     Establish care, KALIA       Initial /64   Pulse 60   Resp 16   Ht 1.778 m (5' 10\")   Wt 97.1 kg (214 lb)   SpO2 95%   BMI 30.71 kg/m   Estimated body mass index is 30.71 kg/m  as calculated from the following:    Height as of this encounter: 1.778 m (5' 10\").    Weight as of this encounter: 97.1 kg (214 lb).    Medication Reconciliation: complete    Neck circumference: 17 inches / 43 centimeters.    ESS 12  CHIDI 7  Brooklyn Oconnor MA      " Alert and oriented to person, place and time, memory intact, behavior appropriate to situation, PERRL.

## 2022-11-01 ENCOUNTER — APPOINTMENT (OUTPATIENT)
Dept: OTOLARYNGOLOGY | Facility: CLINIC | Age: 57
End: 2022-11-01

## 2022-11-01 ENCOUNTER — NON-APPOINTMENT (OUTPATIENT)
Age: 57
End: 2022-11-01

## 2022-11-01 VITALS
DIASTOLIC BLOOD PRESSURE: 80 MMHG | BODY MASS INDEX: 28.14 KG/M2 | WEIGHT: 190 LBS | SYSTOLIC BLOOD PRESSURE: 117 MMHG | HEIGHT: 69 IN | HEART RATE: 91 BPM

## 2022-11-01 DIAGNOSIS — H61.21 IMPACTED CERUMEN, RIGHT EAR: ICD-10-CM

## 2022-11-01 DIAGNOSIS — J30.9 ALLERGIC RHINITIS, UNSPECIFIED: ICD-10-CM

## 2022-11-01 PROCEDURE — 69210 REMOVE IMPACTED EAR WAX UNI: CPT

## 2022-11-01 PROCEDURE — 99213 OFFICE O/P EST LOW 20 MIN: CPT | Mod: 25

## 2022-11-01 RX ORDER — PANTOPRAZOLE 20 MG/1
20 TABLET, DELAYED RELEASE ORAL
Qty: 90 | Refills: 0 | Status: ACTIVE | COMMUNITY
Start: 2022-10-05

## 2022-11-01 NOTE — REVIEW OF SYSTEMS
[Seasonal Allergies] : seasonal allergies [Ear Pain] : ear pain [Hearing Loss] : hearing loss [Nasal Congestion] : nasal congestion [Negative] : Heme/Lymph

## 2022-11-01 NOTE — PHYSICAL EXAM
[de-identified] : Right cerumen impaction. Inflammation of bilateral EAC. Dry skin of bilateral external auditory meati. [Midline] : trachea located in midline position [Normal] : no rashes

## 2022-11-01 NOTE — ASSESSMENT
[FreeTextEntry1] : Arlyn Castano presents for evaluation of right aural fullness and diminished hearing. Right cerumen impaction was removed and she notes resolution of hearing loss. Audiogram was deferred. She has inflammation of bilateral EAC consistent with her known history of chronic otitis externa. Will restart elocon. She has allergy rhinitis and will use her topicla nasal regimen.\par \par - Flonase 2 sprays BID\par - Azelastine 2 sprays qd\par - Elocon to right ear\par - Follow up in 3 months for surveillance audio.

## 2022-11-01 NOTE — HISTORY OF PRESENT ILLNESS
[de-identified] : Arlyn Castano is a 58 yo female who presents for evaluation of bilateral cerumen and intermittent bleeding. She notes that for the past month, she has had intermittent very minimal bleeding from her ears, which she notes has happened before due to dry skin. She notes bilateral aural fullness, right worse than left. She notes bilateral diminished hearing. She notes intermittent tinnitus and has known bilateral moderate high frequency hearing loss. She notes intermittent right otalgia. She denies fevers, chills, or recent ear infections. She has history of allergic rhinitis, using flonase and azelastine sprays.

## 2023-02-06 ENCOUNTER — APPOINTMENT (OUTPATIENT)
Dept: OTOLARYNGOLOGY | Facility: CLINIC | Age: 58
End: 2023-02-06

## 2023-03-31 ENCOUNTER — APPOINTMENT (OUTPATIENT)
Dept: OTOLARYNGOLOGY | Facility: CLINIC | Age: 58
End: 2023-03-31
Payer: COMMERCIAL

## 2023-03-31 VITALS
HEIGHT: 69 IN | SYSTOLIC BLOOD PRESSURE: 96 MMHG | HEART RATE: 71 BPM | BODY MASS INDEX: 29.62 KG/M2 | DIASTOLIC BLOOD PRESSURE: 66 MMHG | WEIGHT: 200 LBS

## 2023-03-31 PROCEDURE — 99214 OFFICE O/P EST MOD 30 MIN: CPT | Mod: 25

## 2023-03-31 PROCEDURE — 69210 REMOVE IMPACTED EAR WAX UNI: CPT

## 2023-03-31 NOTE — PHYSICAL EXAM
[de-identified] : ALETHA CERUMEN REMOVED/ HEARING IMPROVED/ ECZEMA OF THE CANAL MORE ON THE RIGHT [Normal] : mucosa is normal [Midline] : trachea located in midline position

## 2024-01-18 ENCOUNTER — APPOINTMENT (OUTPATIENT)
Dept: OTOLARYNGOLOGY | Facility: CLINIC | Age: 59
End: 2024-01-18
Payer: COMMERCIAL

## 2024-01-18 VITALS
WEIGHT: 200 LBS | BODY MASS INDEX: 29.62 KG/M2 | HEART RATE: 80 BPM | HEIGHT: 69 IN | SYSTOLIC BLOOD PRESSURE: 90 MMHG | DIASTOLIC BLOOD PRESSURE: 63 MMHG

## 2024-01-18 DIAGNOSIS — H60.90 UNSPECIFIED OTITIS EXTERNA, UNSPECIFIED EAR: ICD-10-CM

## 2024-01-18 PROCEDURE — 69210 REMOVE IMPACTED EAR WAX UNI: CPT

## 2024-01-18 PROCEDURE — 99213 OFFICE O/P EST LOW 20 MIN: CPT | Mod: 25

## 2024-01-18 NOTE — ASSESSMENT
[FreeTextEntry1] : CORTISPORIN KEFLEX IF SYMPTOMS ARE NOT GETTING BETTER COULD BE CONSIDERED; SHE SAYS SHE HAS HAD KEFLEX IN THE PAST WITH NO SIDE EFFECTS WATER PERCAUTION OTOWICK REMOVAL 2 DAYS F/U NEXT WEEK

## 2024-01-18 NOTE — PHYSICAL EXAM
[de-identified] : ALETHA CERUMEN REMOVED/ HEARING IMPROVED/ ECZEMA OF THE CANAL MORE ON THE RIGHT/ CANAL IRRITATED/ OTOWICK RIGHT EAR [Normal] : mucosa is normal [Midline] : trachea located in midline position

## 2024-02-14 ENCOUNTER — APPOINTMENT (OUTPATIENT)
Dept: OTOLARYNGOLOGY | Facility: CLINIC | Age: 59
End: 2024-02-14
Payer: COMMERCIAL

## 2024-02-14 VITALS
DIASTOLIC BLOOD PRESSURE: 76 MMHG | SYSTOLIC BLOOD PRESSURE: 121 MMHG | WEIGHT: 205 LBS | HEIGHT: 69 IN | BODY MASS INDEX: 30.36 KG/M2 | HEART RATE: 76 BPM

## 2024-02-14 DIAGNOSIS — H61.20 IMPACTED CERUMEN, UNSPECIFIED EAR: ICD-10-CM

## 2024-02-14 DIAGNOSIS — H60.549 ACUTE ECZEMATOID OTITIS EXTERNA, UNSPECIFIED EAR: ICD-10-CM

## 2024-02-14 PROCEDURE — 99213 OFFICE O/P EST LOW 20 MIN: CPT | Mod: 25

## 2024-02-14 PROCEDURE — 69210 REMOVE IMPACTED EAR WAX UNI: CPT

## 2024-02-14 NOTE — PHYSICAL EXAM
[de-identified] : RIGHT DEBRIS REMOVED/ CANAL ECZEMA RIGHT SIDE MILD [Normal] : mucosa is normal [Midline] : trachea located in midline position

## 2024-02-14 NOTE — HISTORY OF PRESENT ILLNESS
[de-identified] : RIGHT EAR EXTERNAL OTITIS FOLLOW UP FEELING MUCH BETTER ASYMPTOMATIC MEDICAL HX REVIEWED

## 2024-04-04 ENCOUNTER — APPOINTMENT (OUTPATIENT)
Dept: OTOLARYNGOLOGY | Facility: CLINIC | Age: 59
End: 2024-04-04
Payer: COMMERCIAL

## 2024-04-04 VITALS
SYSTOLIC BLOOD PRESSURE: 126 MMHG | WEIGHT: 210 LBS | DIASTOLIC BLOOD PRESSURE: 83 MMHG | BODY MASS INDEX: 31.1 KG/M2 | HEIGHT: 69 IN | HEART RATE: 71 BPM

## 2024-04-04 DIAGNOSIS — H60.90 UNSPECIFIED OTITIS EXTERNA, UNSPECIFIED EAR: ICD-10-CM

## 2024-04-04 PROCEDURE — 99213 OFFICE O/P EST LOW 20 MIN: CPT

## 2024-04-04 RX ORDER — NEOMYCIN AND POLYMYXIN B SULFATES AND HYDROCORTISONE OTIC 10; 3.5; 1 MG/ML; MG/ML; [USP'U]/ML
3.5-10000-1 SUSPENSION AURICULAR (OTIC) 4 TIMES DAILY
Qty: 9 | Refills: 2 | Status: DISCONTINUED | COMMUNITY
Start: 2024-01-18 | End: 2024-04-04

## 2024-04-04 RX ORDER — CEPHALEXIN 500 MG/1
500 CAPSULE ORAL
Qty: 20 | Refills: 1 | Status: DISCONTINUED | COMMUNITY
Start: 2024-01-18 | End: 2024-04-04

## 2024-04-04 RX ORDER — FEXOFENADINE HYDROCHLORIDE 180 MG/1
180 TABLET, FILM COATED ORAL
Refills: 0 | Status: DISCONTINUED | COMMUNITY
End: 2024-04-04

## 2024-04-04 RX ORDER — UBROGEPANT 100 MG/1
100 TABLET ORAL
Qty: 10 | Refills: 0 | Status: DISCONTINUED | COMMUNITY
Start: 2022-07-05 | End: 2024-04-04

## 2024-04-04 RX ORDER — FAMOTIDINE 20 MG/1
20 TABLET, FILM COATED ORAL
Qty: 180 | Refills: 0 | Status: DISCONTINUED | COMMUNITY
Start: 2022-05-09 | End: 2024-04-04

## 2024-04-04 RX ORDER — CHLORHEXIDINE GLUCONATE, 0.12% ORAL RINSE 1.2 MG/ML
0.12 SOLUTION DENTAL
Qty: 473 | Refills: 0 | Status: DISCONTINUED | COMMUNITY
Start: 2022-08-10 | End: 2024-04-04

## 2024-04-04 RX ORDER — FAMOTIDINE 40 MG/1
TABLET, FILM COATED ORAL
Refills: 0 | Status: DISCONTINUED | COMMUNITY
End: 2024-04-04

## 2024-04-04 RX ORDER — NEOMYCIN AND POLYMYXIN B SULFATES AND HYDROCORTISONE OTIC 10; 3.5; 1 MG/ML; MG/ML; [USP'U]/ML
3.5-10000-1 SUSPENSION AURICULAR (OTIC) 4 TIMES DAILY
Qty: 1 | Refills: 4 | Status: DISCONTINUED | COMMUNITY
Start: 2024-02-14 | End: 2024-04-04

## 2024-04-04 RX ORDER — FLUCONAZOLE 100 MG/1
100 TABLET ORAL
Qty: 2 | Refills: 0 | Status: DISCONTINUED | COMMUNITY
Start: 2022-05-03 | End: 2024-04-04

## 2024-04-04 RX ORDER — AZELASTINE HYDROCHLORIDE 137 UG/1
0.1 SPRAY, METERED NASAL
Qty: 3 | Refills: 3 | Status: DISCONTINUED | COMMUNITY
Start: 2022-01-06 | End: 2024-04-04

## 2024-04-04 RX ORDER — MOMETASONE FUROATE 1 MG/G
0.1 OINTMENT TOPICAL TWICE DAILY
Qty: 1 | Refills: 2 | Status: DISCONTINUED | COMMUNITY
Start: 2022-11-01 | End: 2024-04-04

## 2024-04-04 NOTE — PHYSICAL EXAM
[de-identified] : scabs and erythema left ariana, left EAC wet [Normal] : mucosa is normal [Midline] : trachea located in midline position

## 2024-04-18 ENCOUNTER — APPOINTMENT (OUTPATIENT)
Dept: OTOLARYNGOLOGY | Facility: CLINIC | Age: 59
End: 2024-04-18
Payer: COMMERCIAL

## 2024-04-18 VITALS
SYSTOLIC BLOOD PRESSURE: 97 MMHG | BODY MASS INDEX: 30.36 KG/M2 | HEIGHT: 69 IN | DIASTOLIC BLOOD PRESSURE: 65 MMHG | WEIGHT: 205 LBS | HEART RATE: 70 BPM

## 2024-04-18 DIAGNOSIS — K13.0 DISEASES OF LIPS: ICD-10-CM

## 2024-04-18 DIAGNOSIS — H60.63 UNSPECIFIED CHRONIC OTITIS EXTERNA, BILATERAL: ICD-10-CM

## 2024-04-18 PROCEDURE — 99213 OFFICE O/P EST LOW 20 MIN: CPT

## 2024-04-18 RX ORDER — MUPIROCIN 20 MG/G
2 OINTMENT TOPICAL 3 TIMES DAILY
Qty: 1 | Refills: 1 | Status: DISCONTINUED | COMMUNITY
Start: 2024-04-04 | End: 2024-04-18

## 2024-04-18 RX ORDER — NEOMYCIN SULFATE, POLYMYXIN B SULFATE AND HYDROCORTISONE 3.5; 10000; 1 MG/ML; [IU]/ML; MG/ML
3.5-10000-1 SOLUTION AURICULAR (OTIC)
Qty: 1 | Refills: 2 | Status: DISCONTINUED | COMMUNITY
Start: 2024-04-04 | End: 2024-04-18

## 2024-04-18 RX ORDER — CEPHALEXIN 500 MG/1
500 CAPSULE ORAL
Qty: 20 | Refills: 0 | Status: DISCONTINUED | COMMUNITY
Start: 2024-04-04 | End: 2024-04-18

## 2024-04-18 NOTE — PHYSICAL EXAM
[Normal] : mucosa is normal [Midline] : trachea located in midline position [de-identified] : cracked skin and lips at commissure bilat

## 2024-04-18 NOTE — ASSESSMENT
[FreeTextEntry1] : OE resolved monitor for recurrence  inflammation of lips: staph vs fungal trial of mupirocin f/u failure to improve

## 2024-04-18 NOTE — HISTORY OF PRESENT ILLNESS
[de-identified] : 58 yr old female c/o pain AS for 24 hrs +hx OE  4/4 tx w keflex, COS and mupirocin feels much better  c/o cracks at the corners of her lips

## 2024-04-22 RX ORDER — NYSTATIN AND TRIAMCINOLONE ACETONIDE 100000; 1 [USP'U]/G; MG/G
100000-0.1 OINTMENT TOPICAL TWICE DAILY
Qty: 1 | Refills: 0 | Status: ACTIVE | COMMUNITY
Start: 2024-04-22 | End: 1900-01-01

## 2024-06-06 ENCOUNTER — NON-APPOINTMENT (OUTPATIENT)
Age: 59
End: 2024-06-06

## 2024-06-07 ENCOUNTER — EMERGENCY (EMERGENCY)
Facility: HOSPITAL | Age: 59
LOS: 1 days | Discharge: HOME | End: 2024-06-07
Attending: INTERNAL MEDICINE | Admitting: INTERNAL MEDICINE
Payer: COMMERCIAL

## 2024-06-07 VITALS
OXYGEN SATURATION: 99 % | SYSTOLIC BLOOD PRESSURE: 134 MMHG | RESPIRATION RATE: 16 BRPM | HEART RATE: 85 BPM | WEIGHT: 205.03 LBS | DIASTOLIC BLOOD PRESSURE: 86 MMHG | TEMPERATURE: 97 F

## 2024-06-07 DIAGNOSIS — Z95.0 PRESENCE OF CARDIAC PACEMAKER: Chronic | ICD-10-CM

## 2024-06-07 LAB
ALBUMIN SERPL ELPH-MCNC: 3.6 G/DL — SIGNIFICANT CHANGE UP (ref 3.3–5)
ALP SERPL-CCNC: 75 U/L — SIGNIFICANT CHANGE UP (ref 30–120)
ALT FLD-CCNC: 24 U/L — SIGNIFICANT CHANGE UP (ref 10–60)
ANION GAP SERPL CALC-SCNC: 5 MMOL/L — SIGNIFICANT CHANGE UP (ref 5–17)
AST SERPL-CCNC: 20 U/L — SIGNIFICANT CHANGE UP (ref 10–40)
BASOPHILS # BLD AUTO: 0.03 K/UL — SIGNIFICANT CHANGE UP (ref 0–0.2)
BASOPHILS NFR BLD AUTO: 0.5 % — SIGNIFICANT CHANGE UP (ref 0–2)
BILIRUB SERPL-MCNC: 0.2 MG/DL — SIGNIFICANT CHANGE UP (ref 0.2–1.2)
BUN SERPL-MCNC: 31 MG/DL — HIGH (ref 7–23)
CALCIUM SERPL-MCNC: 9.3 MG/DL — SIGNIFICANT CHANGE UP (ref 8.4–10.5)
CHLORIDE SERPL-SCNC: 105 MMOL/L — SIGNIFICANT CHANGE UP (ref 96–108)
CO2 SERPL-SCNC: 30 MMOL/L — SIGNIFICANT CHANGE UP (ref 22–31)
CREAT SERPL-MCNC: 0.95 MG/DL — SIGNIFICANT CHANGE UP (ref 0.5–1.3)
EGFR: 70 ML/MIN/1.73M2 — SIGNIFICANT CHANGE UP
EOSINOPHIL # BLD AUTO: 0.22 K/UL — SIGNIFICANT CHANGE UP (ref 0–0.5)
EOSINOPHIL NFR BLD AUTO: 3.6 % — SIGNIFICANT CHANGE UP (ref 0–6)
GLUCOSE SERPL-MCNC: 106 MG/DL — HIGH (ref 70–99)
HCT VFR BLD CALC: 35.3 % — SIGNIFICANT CHANGE UP (ref 34.5–45)
HGB BLD-MCNC: 11.5 G/DL — SIGNIFICANT CHANGE UP (ref 11.5–15.5)
IMM GRANULOCYTES NFR BLD AUTO: 0.3 % — SIGNIFICANT CHANGE UP (ref 0–0.9)
LYMPHOCYTES # BLD AUTO: 1.58 K/UL — SIGNIFICANT CHANGE UP (ref 1–3.3)
LYMPHOCYTES # BLD AUTO: 25.7 % — SIGNIFICANT CHANGE UP (ref 13–44)
MCHC RBC-ENTMCNC: 29 PG — SIGNIFICANT CHANGE UP (ref 27–34)
MCHC RBC-ENTMCNC: 32.6 GM/DL — SIGNIFICANT CHANGE UP (ref 32–36)
MCV RBC AUTO: 89.1 FL — SIGNIFICANT CHANGE UP (ref 80–100)
MONOCYTES # BLD AUTO: 0.48 K/UL — SIGNIFICANT CHANGE UP (ref 0–0.9)
MONOCYTES NFR BLD AUTO: 7.8 % — SIGNIFICANT CHANGE UP (ref 2–14)
NEUTROPHILS # BLD AUTO: 3.82 K/UL — SIGNIFICANT CHANGE UP (ref 1.8–7.4)
NEUTROPHILS NFR BLD AUTO: 62.1 % — SIGNIFICANT CHANGE UP (ref 43–77)
NRBC # BLD: 0 /100 WBCS — SIGNIFICANT CHANGE UP (ref 0–0)
PLATELET # BLD AUTO: 177 K/UL — SIGNIFICANT CHANGE UP (ref 150–400)
POTASSIUM SERPL-MCNC: 4.4 MMOL/L — SIGNIFICANT CHANGE UP (ref 3.5–5.3)
POTASSIUM SERPL-SCNC: 4.4 MMOL/L — SIGNIFICANT CHANGE UP (ref 3.5–5.3)
PROT SERPL-MCNC: 7.4 G/DL — SIGNIFICANT CHANGE UP (ref 6–8.3)
RBC # BLD: 3.96 M/UL — SIGNIFICANT CHANGE UP (ref 3.8–5.2)
RBC # FLD: 13.1 % — SIGNIFICANT CHANGE UP (ref 10.3–14.5)
SODIUM SERPL-SCNC: 140 MMOL/L — SIGNIFICANT CHANGE UP (ref 135–145)
TROPONIN I, HIGH SENSITIVITY RESULT: 5.4 NG/L — SIGNIFICANT CHANGE UP
WBC # BLD: 6.15 K/UL — SIGNIFICANT CHANGE UP (ref 3.8–10.5)
WBC # FLD AUTO: 6.15 K/UL — SIGNIFICANT CHANGE UP (ref 3.8–10.5)

## 2024-06-07 PROCEDURE — 71045 X-RAY EXAM CHEST 1 VIEW: CPT

## 2024-06-07 PROCEDURE — 84484 ASSAY OF TROPONIN QUANT: CPT

## 2024-06-07 PROCEDURE — 80053 COMPREHEN METABOLIC PANEL: CPT

## 2024-06-07 PROCEDURE — 85025 COMPLETE CBC W/AUTO DIFF WBC: CPT

## 2024-06-07 PROCEDURE — 99285 EMERGENCY DEPT VISIT HI MDM: CPT

## 2024-06-07 PROCEDURE — 71045 X-RAY EXAM CHEST 1 VIEW: CPT | Mod: 26

## 2024-06-07 PROCEDURE — 96374 THER/PROPH/DIAG INJ IV PUSH: CPT

## 2024-06-07 PROCEDURE — 70450 CT HEAD/BRAIN W/O DYE: CPT | Mod: MC

## 2024-06-07 PROCEDURE — 70450 CT HEAD/BRAIN W/O DYE: CPT | Mod: 26,MC

## 2024-06-07 PROCEDURE — 93010 ELECTROCARDIOGRAM REPORT: CPT

## 2024-06-07 PROCEDURE — 99285 EMERGENCY DEPT VISIT HI MDM: CPT | Mod: 25

## 2024-06-07 PROCEDURE — 36415 COLL VENOUS BLD VENIPUNCTURE: CPT

## 2024-06-07 PROCEDURE — 93005 ELECTROCARDIOGRAM TRACING: CPT

## 2024-06-07 RX ORDER — KETOROLAC TROMETHAMINE 30 MG/ML
15 SYRINGE (ML) INJECTION ONCE
Refills: 0 | Status: DISCONTINUED | OUTPATIENT
Start: 2024-06-07 | End: 2024-06-07

## 2024-06-07 RX ORDER — SODIUM CHLORIDE 9 MG/ML
1000 INJECTION INTRAMUSCULAR; INTRAVENOUS; SUBCUTANEOUS ONCE
Refills: 0 | Status: COMPLETED | OUTPATIENT
Start: 2024-06-07 | End: 2024-06-07

## 2024-06-07 RX ADMIN — Medication 15 MILLIGRAM(S): at 21:40

## 2024-06-07 RX ADMIN — SODIUM CHLORIDE 1000 MILLILITER(S): 9 INJECTION INTRAMUSCULAR; INTRAVENOUS; SUBCUTANEOUS at 21:40

## 2024-06-07 RX ADMIN — Medication 15 MILLIGRAM(S): at 23:10

## 2024-06-07 NOTE — ED ADULT NURSE NOTE - NSICDXFAMILYHX_GEN_ALL_CORE_FT
FAMILY HISTORY:  Father  Still living? Yes, Estimated age: Age Unknown  Afib, Age at diagnosis: Age Unknown    Mother  Still living? Yes, Estimated age: Age Unknown  Family history of osteoporosis, Age at diagnosis: Age Unknown

## 2024-06-07 NOTE — ED PROVIDER NOTE - PATIENT PORTAL LINK FT
You can access the FollowMyHealth Patient Portal offered by [facility] by registering at the following website: [email or web address]. By joining [facility]s FollowMyHealth portal, you will also be able to view your health information using other applications (apps) compatible with our system.

## 2024-06-07 NOTE — ED ADULT NURSE NOTE - OBJECTIVE STATEMENT
59-year-old female  sent to ED by urgent care for left-sided jaw pain for the past few hours.  Patient admits that she had a mild headache earlier today which feels typical of her chronic headaches.  Patient took Excedrin with improvement of headache.  Jaw pain is constant, throbbing, nonradiating, not worse with jaw movement or palpitation.  Denies fever, chills, dizziness, visual changes, speech or gait changes, upper or lower extremity weakness or paresthesias, chest pain, shortness of breath, ear pain or dental pain. Patient admits to having TMJ but this feels different.

## 2024-06-07 NOTE — ED PROVIDER NOTE - ENMT, MLM
Airway patent, Nasal mucosa clear. Mouth with normal mucosa. Throat has no vesicles, no oropharyngeal exudates and uvula is midline. no reproducible tenderness to left jaw or TMJ with FROM.

## 2024-06-07 NOTE — ED ADULT NURSE NOTE - NSICDXPASTMEDICALHX_GEN_ALL_CORE_FT
PAST MEDICAL HISTORY:  Anemia     Asthma     Endometriosis     Gastroesophageal reflux disease, esophagitis presence not specified     Hiatal hernia     Hypotension, unspecified hypotension type     Muscle weakness (generalized)     Sick sinus syndrome

## 2024-06-07 NOTE — ED PROVIDER NOTE - OBJECTIVE STATEMENT
59-year-old female with history of migraines, depression, pacemaker sent to ED by urgent care for left-sided jaw pain for the past few hours.  Patient admits that she had a mild headache earlier today which feels typical of her chronic headaches.  Patient took Excedrin with improvement of headache.  Jaw pain is constant, throbbing, nonradiating, not worse with jaw movement or palpitation.  Denies fever, chills, dizziness, visual changes, speech or gait changes, upper or lower extremity weakness or paresthesias, chest pain, shortness of breath, ear pain or dental pain. Patient admits to having TMJ but this feels different.

## 2024-06-07 NOTE — ED PROVIDER NOTE - CLINICAL SUMMARY MEDICAL DECISION MAKING FREE TEXT BOX
59-year-old female with history of migraines, depression, pacemaker sent to ED by urgent care for migraine Headache and  left mandibular  pain for the past few hours. no fever no chills no toxemia,  no chest pain, no SOB, no exertional symptoms,  no palpitations, no n/v,  no neuro changes. VSS, Exam improved after Toradol,  EKG, non specific T Abnormality, Troponin x 2 negative CT head normal. discharged with O/P cardiology referral

## 2024-06-07 NOTE — ED ADULT NURSE REASSESSMENT NOTE - NS ED NURSE REASSESS COMMENT FT1
received report and assumed care of pt at change of shift. pt awake and alert. pt reports some relief of pain. to have repeat troponin later tonight. cm=sinus

## 2024-06-08 VITALS
RESPIRATION RATE: 17 BRPM | SYSTOLIC BLOOD PRESSURE: 110 MMHG | HEART RATE: 73 BPM | OXYGEN SATURATION: 96 % | TEMPERATURE: 98 F | DIASTOLIC BLOOD PRESSURE: 60 MMHG

## 2024-06-08 LAB — TROPONIN I, HIGH SENSITIVITY RESULT: 6.4 NG/L — SIGNIFICANT CHANGE UP

## 2024-10-15 ENCOUNTER — APPOINTMENT (OUTPATIENT)
Dept: UROLOGY | Facility: CLINIC | Age: 59
End: 2024-10-15

## 2024-12-05 ENCOUNTER — APPOINTMENT (OUTPATIENT)
Dept: OTOLARYNGOLOGY | Facility: CLINIC | Age: 59
End: 2024-12-05
Payer: COMMERCIAL

## 2024-12-05 VITALS
BODY MASS INDEX: 30.36 KG/M2 | HEIGHT: 69 IN | HEART RATE: 83 BPM | WEIGHT: 205 LBS | SYSTOLIC BLOOD PRESSURE: 118 MMHG | DIASTOLIC BLOOD PRESSURE: 75 MMHG

## 2024-12-05 DIAGNOSIS — H61.20 IMPACTED CERUMEN, UNSPECIFIED EAR: ICD-10-CM

## 2024-12-05 DIAGNOSIS — H60.501 UNSPECIFIED ACUTE NONINFECTIVE OTITIS EXTERNA, RIGHT EAR: ICD-10-CM

## 2024-12-05 PROCEDURE — 99213 OFFICE O/P EST LOW 20 MIN: CPT | Mod: 25

## 2024-12-05 PROCEDURE — 69210 REMOVE IMPACTED EAR WAX UNI: CPT

## 2024-12-05 RX ORDER — ATORVASTATIN CALCIUM 20 MG/1
20 TABLET, FILM COATED ORAL
Refills: 0 | Status: ACTIVE | COMMUNITY

## 2024-12-19 ENCOUNTER — APPOINTMENT (OUTPATIENT)
Dept: OTOLARYNGOLOGY | Facility: CLINIC | Age: 59
End: 2024-12-19

## 2024-12-27 NOTE — ED PROVIDER NOTE - CARE PROVIDER_API CALL
No
Antonio Hanson  Cardiology  [address]  [place] [address]  Phone: [phone]  Fax: [phone]  Follow Up Time: 1-3 Days